# Patient Record
Sex: MALE | Race: WHITE | NOT HISPANIC OR LATINO | URBAN - METROPOLITAN AREA
[De-identification: names, ages, dates, MRNs, and addresses within clinical notes are randomized per-mention and may not be internally consistent; named-entity substitution may affect disease eponyms.]

---

## 2014-01-30 LAB — CRC RECOMMENDATION EXT: NORMAL

## 2022-04-10 ENCOUNTER — HISTORICAL (OUTPATIENT)
Dept: ADMINISTRATIVE | Facility: HOSPITAL | Age: 57
End: 2022-04-10

## 2022-04-26 VITALS
HEIGHT: 70 IN | OXYGEN SATURATION: 98 % | WEIGHT: 232.13 LBS | BODY MASS INDEX: 33.23 KG/M2 | SYSTOLIC BLOOD PRESSURE: 119 MMHG | DIASTOLIC BLOOD PRESSURE: 81 MMHG

## 2022-06-09 RX ORDER — CITALOPRAM 10 MG/1
TABLET ORAL
COMMUNITY
Start: 2022-02-07 | End: 2023-07-17 | Stop reason: SDUPTHER

## 2022-06-09 RX ORDER — ATORVASTATIN CALCIUM 40 MG/1
40 TABLET, FILM COATED ORAL DAILY
COMMUNITY
Start: 2022-03-25

## 2022-06-09 RX ORDER — LISINOPRIL 10 MG/1
TABLET ORAL
COMMUNITY
Start: 2021-12-17 | End: 2022-06-20 | Stop reason: SDUPTHER

## 2022-06-20 DIAGNOSIS — I10 HYPERTENSION, UNSPECIFIED TYPE: Primary | ICD-10-CM

## 2022-06-21 RX ORDER — LISINOPRIL 10 MG/1
10 TABLET ORAL DAILY
Qty: 90 TABLET | Refills: 3 | Status: SHIPPED | OUTPATIENT
Start: 2022-06-21 | End: 2023-06-14 | Stop reason: SDUPTHER

## 2022-12-13 ENCOUNTER — PATIENT OUTREACH (OUTPATIENT)
Dept: ADMINISTRATIVE | Facility: HOSPITAL | Age: 57
End: 2022-12-13

## 2023-06-14 DIAGNOSIS — I10 HYPERTENSION, UNSPECIFIED TYPE: ICD-10-CM

## 2023-06-14 RX ORDER — LISINOPRIL 10 MG/1
10 TABLET ORAL DAILY
Qty: 90 TABLET | Refills: 3 | Status: SHIPPED | OUTPATIENT
Start: 2023-06-14

## 2023-07-17 DIAGNOSIS — F32.A DEPRESSION, UNSPECIFIED DEPRESSION TYPE: Primary | ICD-10-CM

## 2023-07-17 RX ORDER — CITALOPRAM 10 MG/1
TABLET ORAL
Qty: 90 TABLET | Refills: 0 | Status: SHIPPED | OUTPATIENT
Start: 2023-07-17

## 2024-04-17 ENCOUNTER — OFFICE VISIT (OUTPATIENT)
Dept: URGENT CARE | Facility: CLINIC | Age: 59
End: 2024-04-17
Payer: COMMERCIAL

## 2024-04-17 VITALS
HEIGHT: 71 IN | HEART RATE: 64 BPM | BODY MASS INDEX: 29.68 KG/M2 | OXYGEN SATURATION: 98 % | RESPIRATION RATE: 18 BRPM | SYSTOLIC BLOOD PRESSURE: 122 MMHG | WEIGHT: 212 LBS | TEMPERATURE: 99 F | DIASTOLIC BLOOD PRESSURE: 81 MMHG

## 2024-04-17 DIAGNOSIS — R10.32 LEFT LOWER QUADRANT ABDOMINAL PAIN: Primary | ICD-10-CM

## 2024-04-17 PROCEDURE — 99213 OFFICE O/P EST LOW 20 MIN: CPT | Mod: ,,, | Performed by: FAMILY MEDICINE

## 2024-04-17 RX ORDER — TADALAFIL 10 MG/1
10 TABLET ORAL
COMMUNITY
Start: 2024-03-28

## 2024-04-17 NOTE — PROGRESS NOTES
"Subjective:      Patient ID: Jason Leal is a 58 y.o. male.    Vitals:  height is 5' 11" (1.803 m) and weight is 96.2 kg (212 lb). His temperature is 98.5 °F (36.9 °C). His blood pressure is 122/81 and his pulse is 64. His respiration is 18 and oxygen saturation is 98%.     Chief Complaint: Abdominal Pain     Patient is a 58 y.o. male who presents to urgent care with complaints of LLQ pain since after pulling around an ice chest x 2 weeks. Alleviating factors include Tylenol with mild amount of relief.     Abdominal Pain      Gastrointestinal:  Positive for abdominal pain.        ROS:  Constitutional : Negative for fever, Negative for weakness  HEENT : No sore throat,No earache  Neck : Negative except HPI  Respiratory : Negative for shortness of breath and wheezing  Cardiovascular : Negative for chest pain, no palpitations   Gastrointestinal : Negative except as documented in HPI  Integumentary : Negative for skin rash  Neurological : Negative except HPI     58-year-old male with known history of hypertension, elevated cholesterol present to clinic with concerns of left lower quadrant abdominal pain.  Started 2 weeks ago when he pulled heavy ice chest for long distance.  Symptoms improved, when he was holding his activities for some time including running.  Started running again in the last 3-4 days and felt the pain.  Appears concerned with hernia.  No local swelling, no inguinal swelling.  Objective:     Physical Exam  General : Alert and Oriented, No apparent distress, afebrile  Neck - supple  HENT : Oropharynx no redness or swelling.   Respiratory : Bilateral equal breath sounds, nonlabored respirations  Cardiovascular : Rate, rhythm regular, normal volume pulse, no murmur  Gastrointestinal: Full abdomen, soft, left lower quadrant tenderness to palpate, no guarding or rigidity.  All other quadrants nontender to palpate, no palpable swelling.  No redness or bruising  Left groin nontender to " palpate  Integumentary : Warm, Dry and no rash    Assessment:     1. Left lower quadrant abdominal pain      Plan:   Discussed in detail on physical finding and differential diagnosis   Considering the duration of symptoms and localized pain, ultrasound today to rule out possible hernia  Encouraged to monitor the symptoms.  ER precautions.  Ultrasound results will be monitored on reported.  Call this clinic for any questions    Left lower quadrant abdominal pain  -     US Abdomen Limited; Future; Expected date: 04/17/2024

## 2024-04-17 NOTE — PATIENT INSTRUCTIONS
Discussed in detail on physical finding and differential diagnosis   Considering the duration of symptoms and localized pain, ultrasound today to rule out possible hernia  Encouraged to monitor the symptoms.  ER precautions.  Ultrasound results will be monitored on reported.  Call this clinic for any questions

## 2024-06-05 DIAGNOSIS — Z00.00 WELLNESS EXAMINATION: Primary | ICD-10-CM

## 2024-06-06 PROBLEM — Z00.00 WELLNESS EXAMINATION: Status: ACTIVE | Noted: 2024-06-06

## 2024-06-06 NOTE — PROGRESS NOTES
Date: 06/13/2024  Patient ID: [id number]   Chief Complaint: Annual Exam    HPI:   Jason Leal is a 58 y.o. male here today for an annual wellness. Reviewed and discussed lab results: labs wnl.    Overall he feels well. Anxiety improved since changed jobs. Conditions are stable with meds. 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 1 month ago pt developed lesions on base of penis after being exposed to allergen when he developed URI. URI resolved, but lesions remained: not itchy or painful or draining. He tried tea tree oil, salicylic acid, lysine. Lesions did not improve but did not spread. He is in monogamous relationship but did have extra relations 3+yrs ago    Diet: healthy  Activity level: exercising regularly. Runs 3-4x/wk    Patient Active Problem List   Diagnosis    Wellness examination    HTN (hypertension)    HLD (hyperlipidemia)    Anxiety disorder, unspecified    Class 1 obesity with body mass index (BMI) of 30.0 to 30.9 in adult    Erectile dysfunction    Grouped skin lesions     Outpatient Medications Marked as Taking for the 6/13/24 encounter (Office Visit) with Rosario Arriola MD   Medication Sig Dispense Refill    tadalafiL (CIALIS) 10 MG tablet Take 10 mg by mouth.      [DISCONTINUED] atorvastatin (LIPITOR) 40 MG tablet Take 40 mg by mouth once daily.      [DISCONTINUED] citalopram (CELEXA) 10 MG tablet See Instructions, Take 1 tablet by mouth once daily, # 90 tab(s), 3 Refill(s), Pharmacy: [facility], 178, cm, Height/Length Dosing, 10/05/21 13:47:00 CDT, 105.3, kg, Weight Dosing, 10/05/21 13:47:00 CDT 90 tablet 0    [DISCONTINUED] lisinopriL 10 MG tablet Take 1 tablet (10 mg total) by mouth once daily. 90 tablet 3     Past Medical History:   Diagnosis Date    Anxiety disorder, unspecified     HLD (hyperlipidemia)     HTN (hypertension)      Past Surgical History:   Procedure Laterality Date    APPENDECTOMY       Review of patient's allergies indicates:  No Known  Allergies  Family History   Problem Relation Name Age of Onset    Heart disease Father      Diabetes type II Brother        Social History     Socioeconomic History    Marital status:    Tobacco Use    Smoking status: Never     Passive exposure: Never    Smokeless tobacco: Never   Substance and Sexual Activity    Alcohol use: Yes    Drug use: Never    Sexual activity: Yes     Social Determinants of Health     Financial Resource Strain: Low Risk  (6/13/2024)    Overall Financial Resource Strain (CARDIA)     Difficulty of Paying Living Expenses: Not hard at all   Food Insecurity: No Food Insecurity (6/13/2024)    Hunger Vital Sign     Worried About Running Out of Food in the Last Year: Never true     Ran Out of Food in the Last Year: Never true   Transportation Needs: No Transportation Needs (6/13/2024)    TRANSPORTATION NEEDS     Transportation : No   Physical Activity: Sufficiently Active (6/13/2024)    Exercise Vital Sign     Days of Exercise per Week: 4 days     Minutes of Exercise per Session: 60 min   Stress: No Stress Concern Present (6/13/2024)    [employer] - Occupational Stress Questionnaire     Feeling of Stress : Not at all   Housing Stability: Low Risk  (6/13/2024)    Housing Stability Vital Sign     Unable to Pay for Housing in the Last Year: No     Homeless in the Last Year: No     Patient Care Team:  Rosario Arriola MD as PCP - General (Family Medicine)  Sebas Hernandez MD as Consulting Physician (Gastroenterology)   Subjective:   Review of Systems   Constitutional: Negative.  Negative for fever and weight loss.   HENT:  Negative for congestion, hearing loss and sore throat.    Eyes:  Negative for blurred vision.   Respiratory:  Negative for cough and shortness of breath.    Cardiovascular:  Negative for chest pain, palpitations and leg swelling.   Gastrointestinal:  Negative for abdominal pain, blood in stool, constipation, diarrhea, nausea and vomiting.  "  Genitourinary:  Negative for dysuria, frequency, hematuria and urgency.        Weak stream   Musculoskeletal:  Negative for joint pain.   Skin:  Negative for rash.   Neurological:  Negative for weakness and headaches.   Psychiatric/Behavioral:  Negative for depression. The patient is not nervous/anxious and does not have insomnia.      See HPI for details  All Other ROS: Negative except as stated in HPI  Objective:   /82   Pulse 60   Temp 97.5 °F (36.4 °C)   Ht 5' 11" (1.803 m)   Wt 97.8 kg (215 lb 9.6 oz)   SpO2 98%   BMI 30.07 kg/m²   Physical Exam  Constitutional:       Appearance: Normal appearance. He is obese.   HENT:      Head: Normocephalic and atraumatic.      Right Ear: External ear normal.      Left Ear: External ear normal.      Nose: No congestion or rhinorrhea.      Mouth/Throat:      Pharynx: No oropharyngeal exudate or posterior oropharyngeal erythema.   Eyes:      General: No scleral icterus.        Right eye: No discharge.         Left eye: No discharge.      Extraocular Movements: Extraocular movements intact.      Conjunctiva/sclera: Conjunctivae normal.   Cardiovascular:      Rate and Rhythm: Normal rate and regular rhythm.      Pulses: Normal pulses.      Heart sounds: Normal heart sounds.   Pulmonary:      Effort: Pulmonary effort is normal.      Breath sounds: Normal breath sounds.   Abdominal:      General: Abdomen is flat. Bowel sounds are normal.      Palpations: Abdomen is soft.   Genitourinary:     Penis: Normal.       Testes: Normal.      Comments: 1-2mm papular raised grouped lesions around base of penis without bleeding, discharge, erythema  Chaperone present  Musculoskeletal:         General: No swelling or deformity. Normal range of motion.      Cervical back: Normal range of motion and neck supple.   Skin:     General: Skin is warm and dry.   Neurological:      Mental Status: He is alert and oriented to person, place, and time.   Psychiatric:         Mood and Affect: " Mood normal.         Behavior: Behavior normal.         Thought Content: Thought content normal.         Judgment: Judgment normal.       Assessment:       ICD-10-CM ICD-9-CM   1. Wellness examination  Z00.00 V70.0   2. Primary hypertension  I10 401.9   3. Hyperlipidemia, unspecified hyperlipidemia type  E78.5 272.4   4. Anxiety disorder, unspecified type  F41.9 300.00   5. Class 1 obesity with body mass index (BMI) of 30.0 to 30.9 in adult, unspecified obesity type, unspecified whether serious comorbidity present  E66.9 278.00    Z68.30 V85.30   6. Erectile dysfunction, unspecified erectile dysfunction type  N52.9 607.84   7. Screening for colorectal cancer  Z12.11 V76.51    Z12.12 V76.41   8. Grouped skin lesions  L98.9 709.9   9. Screening for malignant neoplasm of prostate  Z12.5 V76.44      Plan:   1. Wellness examination  Assessment & Plan:  Provided Jason KWAN Elvis with a 5-10 year written screening schedule and personal prevention plan. Recommendations were developed using the USPSTF age appropriate recommendations. Education, counseling, and referrals were provided as needed. After Visit Summary printed and given to patient which includes a list of additional screenings\tests needed.         2. Primary hypertension  Assessment & Plan:  Stable  Continue current medication regimen: Lisinopril 10mg qd  Blood pressure at goal <140/90 (<130/80 if otherwise noted)  Recommend DASH diet  Avoid tobacco use  Record BP at home daily and bring log to next office visit, assure that home cuff is calibrated at minimum every 12 months      Orders:  -     lisinopriL 10 MG tablet; Take 1 tablet (10 mg total) by mouth once daily.  Dispense: 90 tablet; Refill: 3  -     Comprehensive Metabolic Panel; Future; Expected date: 06/13/2025    3. Hyperlipidemia, unspecified hyperlipidemia type  Assessment & Plan:  Stable  Continue Rx: Atorvastatin 40mg qd   The ASCVD Risk score (Anni TYLER, et al., 2019) failed to calculate for the  following reasons:    The valid total cholesterol range is 130 to 320 mg/dL   Can try omega-3 fatty acids (total,LDL,TG), niacin (TG), CoQ10 (total), Red yeast rice (LDL,TG,inc HDL)  Encourage weight loss by least 5% and to increase aerobic exercise and resistance training  Limit simple sugars and simple carbohydrate intake-focus on low glycemic foods  Optimize blood sugar control          Orders:  -     atorvastatin (LIPITOR) 40 MG tablet; Take 1 tablet (40 mg total) by mouth once daily.  Dispense: 90 tablet; Refill: 3  -     Lipid Panel; Future; Expected date: 06/13/2025    4. Anxiety disorder, unspecified type  Assessment & Plan:  Stable  Continue Celexa 10mg qd  Refill alprazolam prn for flying    Orders:  -     ALPRAZolam (XANAX) 0.25 MG tablet; Take 1 tablet (0.25 mg total) by mouth 3 (three) times daily as needed for Anxiety.  Dispense: 12 tablet; Refill: 0  -     citalopram (CELEXA) 10 MG tablet; See Instructions, Take 1 tablet by mouth once daily, # 90 tab(s), 3 Refill(s), Pharmacy: [facility], 178, cm, Height/Length Dosing, 10/05/21 13:47:00 CDT, 105.3, kg, Weight Dosing, 10/05/21 13:47:00 CDT  Dispense: 90 tablet; Refill: 3    5. Class 1 obesity with body mass index (BMI) of 30.0 to 30.9 in adult, unspecified obesity type, unspecified whether serious comorbidity present  Assessment & Plan:  Body mass index is 30.07 kg/m².  For BMI 25-30: recommend behavior modifications  Recommend intensive, multicomponent, behavioral interventions: Goal BMI <30.  -Goal is to exercise at least 5 times a week for 30 minutes per day.   -Stand more each day.   -Increase exercise: Start with 10 minutes daily and build up to 60-90 minutes/day with moderate activity  -Reduce caloric intake to about 1500 kcal/day  -Avoid soda, simple sugars, excessive rice, potatoes or bread. Limit fast foods and fried foods.  -Choose complex carbs in moderation (example: green vegetables, beans, oatmeal). Eat plenty of fresh fruits  and vegetables with lean meats daily.  -Do not skip meals. Eat a balanced portion size.  -Avoid fad diets. Consider long-term healthy life style changes.       Orders:  -     Hemoglobin A1C; Future; Expected date: 06/13/2025    6. Erectile dysfunction, unspecified erectile dysfunction type  Assessment & Plan:  Stable  Continue cialis 10mg prn      7. Screening for colorectal cancer  -     Ambulatory referral/consult to Endo Procedure     8. Grouped skin lesions  Assessment & Plan:  Refer to dermatology     Orders:  -     Ambulatory referral/consult to Dermatology; Future; Expected date: 06/20/2024    9. Screening for malignant neoplasm of prostate  -     PSA, Screening; Future; Expected date: 06/13/2025         Medication List with Changes/Refills   New Medications    ALPRAZOLAM (XANAX) 0.25 MG TABLET    Take 1 tablet (0.25 mg total) by mouth 3 (three) times daily as needed for Anxiety.       Start Date: 6/13/2024 End Date: --   Current Medications    TADALAFIL (CIALIS) 10 MG TABLET    Take 10 mg by mouth.       Start Date: 3/28/2024 End Date: --   Changed and/or Refilled Medications    Modified Medication Previous Medication    ATORVASTATIN (LIPITOR) 40 MG TABLET atorvastatin (LIPITOR) 40 MG tablet       Take 1 tablet (40 mg total) by mouth once daily.    Take 40 mg by mouth once daily.       Start Date: 6/13/2024 End Date: --    Start Date: 3/25/2022 End Date: 6/13/2024    CITALOPRAM (CELEXA) 10 MG TABLET citalopram (CELEXA) 10 MG tablet       See Instructions, Take 1 tablet by mouth once daily, # 90 tab(s), 3 Refill(s), Pharmacy: [facility], 178, cm, Height/Length Dosing, 10/05/21 13:47:00 CDT, 105.3, kg, Weight Dosing, 10/05/21 13:47:00 CDT    See Instructions, Take 1 tablet by mouth once daily, # 90 tab(s), 3 Refill(s), Pharmacy: [facility], 178, cm, Height/Length Dosing, 10/05/21 13:47:00 CDT, 105.3, kg, Weight Dosing, 10/05/21 13:47:00 CDT       Start Date: 6/13/2024 End Date: --     Start Date: 7/17/2023 End Date: 6/13/2024    LISINOPRIL 10 MG TABLET lisinopriL 10 MG tablet       Take 1 tablet (10 mg total) by mouth once daily.    Take 1 tablet (10 mg total) by mouth once daily.       Start Date: 6/13/2024 End Date: --    Start Date: 6/14/2023 End Date: 6/13/2024   Discontinued Medications    ALPRAZOLAM ODT (NIRAVAM) 0.5 MG TBDL    by Per OG tube route nightly as needed.       Start Date: --        End Date: 6/13/2024        The patient's Health Maintenance was reviewed and the following appears to be due at this time:   Health Maintenance Due   Topic Date Due    Hepatitis C Screening  Never done    HIV Screening  Never done    TETANUS VACCINE  Never done    Hemoglobin A1c (Diabetic Prevention Screening)  Never done    Shingles Vaccine (1 of 2) Never done    Colorectal Cancer Screening  01/30/2019    COVID-19 Vaccine (1 - 2023-24 season) Never done    RSV Vaccine (Age 60+ and Pregnant patients) (1 - 1-dose 60+ series) Never done     Health Maintenance Topics with due status: Not Due       Topic Last Completion Date    Lipid Panel 06/11/2024    Influenza Vaccine Not Due        Alcohol/Tobacco Use - Discussed importance of smoking avoidance/cessation and limiting alcohol intake.    CVD Risk Factors - Reviewed and discussed with patient    Obesity/Physical Activity - BMI = Body mass index is 30.07 kg/m².  . Encouraged 30 minute daily physical activity, 5 days per week.    STD screening (At least once 15-65y or when necessary) - deferred     Depression screening (Every year or when necessary)- negative    Colon Cancer Screening -  1/2014 AMY Hernandez polyps, diverticulosis. Rpt in 5 yrs    Prostate Cancer Screening (>55y or >40y with FH)- Follow up annually as indicated.  Lab Results   Component Value Date    PSA 1.50 06/11/2024         Eye Exam - Recommend annual eye exams    Dental Exam - Recommend biannual exams    Vaccinations - will obtain at pharmacy    A separate E/M code has been provided  to evaluate additional concerns addressed during the dedicated Wellness Exam.    Follow up if symptoms worsen or fail to improve, for Wellness. In addition to their scheduled follow up, the patient has also been instructed to follow up on as needed basis.

## 2024-06-06 NOTE — ASSESSMENT & PLAN NOTE
Provided Jason Leal with a 5-10 year written screening schedule and personal prevention plan. Recommendations were developed using the USPSTF age appropriate recommendations. Education, counseling, and referrals were provided as needed. After Visit Summary printed and given to patient which includes a list of additional screenings\tests needed.

## 2024-06-11 ENCOUNTER — LAB VISIT (OUTPATIENT)
Dept: LAB | Facility: HOSPITAL | Age: 59
End: 2024-06-11
Attending: STUDENT IN AN ORGANIZED HEALTH CARE EDUCATION/TRAINING PROGRAM
Payer: COMMERCIAL

## 2024-06-11 DIAGNOSIS — Z00.00 WELLNESS EXAMINATION: ICD-10-CM

## 2024-06-11 LAB
ALBUMIN SERPL-MCNC: 4.3 G/DL (ref 3.4–4.8)
ALBUMIN/GLOB SERPL: 1.7 RATIO (ref 1.1–2)
ALP SERPL-CCNC: 75 UNIT/L (ref 40–150)
ALT SERPL-CCNC: 26 UNIT/L (ref 0–55)
ANION GAP SERPL CALC-SCNC: 5 MEQ/L
AST SERPL-CCNC: 23 UNIT/L (ref 5–34)
BASOPHILS # BLD AUTO: 0.03 X10(3)/MCL
BASOPHILS NFR BLD AUTO: 0.5 %
BILIRUB SERPL-MCNC: 4 MG/DL
BUN SERPL-MCNC: 19.7 MG/DL (ref 8.4–25.7)
CALCIUM SERPL-MCNC: 9.5 MG/DL (ref 8.8–10)
CHLORIDE SERPL-SCNC: 106 MMOL/L (ref 98–107)
CHOLEST SERPL-MCNC: 124 MG/DL
CHOLEST/HDLC SERPL: 3 {RATIO} (ref 0–5)
CO2 SERPL-SCNC: 28 MMOL/L (ref 23–31)
CREAT SERPL-MCNC: 0.89 MG/DL (ref 0.73–1.18)
CREAT/UREA NIT SERPL: 22
EOSINOPHIL # BLD AUTO: 0.28 X10(3)/MCL (ref 0–0.9)
EOSINOPHIL NFR BLD AUTO: 5.1 %
ERYTHROCYTE [DISTWIDTH] IN BLOOD BY AUTOMATED COUNT: 12.7 % (ref 11.5–17)
GFR SERPLBLD CREATININE-BSD FMLA CKD-EPI: >60 ML/MIN/1.73/M2
GLOBULIN SER-MCNC: 2.5 GM/DL (ref 2.4–3.5)
GLUCOSE SERPL-MCNC: 97 MG/DL (ref 82–115)
HCT VFR BLD AUTO: 47.2 % (ref 42–52)
HDLC SERPL-MCNC: 41 MG/DL (ref 35–60)
HGB BLD-MCNC: 16.1 G/DL (ref 14–18)
IMM GRANULOCYTES # BLD AUTO: 0.02 X10(3)/MCL (ref 0–0.04)
IMM GRANULOCYTES NFR BLD AUTO: 0.4 %
LDLC SERPL CALC-MCNC: 70 MG/DL (ref 50–140)
LYMPHOCYTES # BLD AUTO: 1.31 X10(3)/MCL (ref 0.6–4.6)
LYMPHOCYTES NFR BLD AUTO: 23.7 %
MCH RBC QN AUTO: 30.7 PG (ref 27–31)
MCHC RBC AUTO-ENTMCNC: 34.1 G/DL (ref 33–36)
MCV RBC AUTO: 89.9 FL (ref 80–94)
MONOCYTES # BLD AUTO: 0.55 X10(3)/MCL (ref 0.1–1.3)
MONOCYTES NFR BLD AUTO: 10 %
NEUTROPHILS # BLD AUTO: 3.33 X10(3)/MCL (ref 2.1–9.2)
NEUTROPHILS NFR BLD AUTO: 60.3 %
NRBC BLD AUTO-RTO: 0 %
PLATELET # BLD AUTO: 186 X10(3)/MCL (ref 130–400)
PMV BLD AUTO: 10.2 FL (ref 7.4–10.4)
POTASSIUM SERPL-SCNC: 4.5 MMOL/L (ref 3.5–5.1)
PROT SERPL-MCNC: 6.8 GM/DL (ref 5.8–7.6)
PSA SERPL-MCNC: 1.5 NG/ML
RBC # BLD AUTO: 5.25 X10(6)/MCL (ref 4.7–6.1)
SODIUM SERPL-SCNC: 139 MMOL/L (ref 136–145)
TRIGL SERPL-MCNC: 65 MG/DL (ref 34–140)
VLDLC SERPL CALC-MCNC: 13 MG/DL
WBC # SPEC AUTO: 5.52 X10(3)/MCL (ref 4.5–11.5)

## 2024-06-11 PROCEDURE — 85025 COMPLETE CBC W/AUTO DIFF WBC: CPT

## 2024-06-11 PROCEDURE — 36415 COLL VENOUS BLD VENIPUNCTURE: CPT

## 2024-06-11 PROCEDURE — 80061 LIPID PANEL: CPT

## 2024-06-11 PROCEDURE — 80053 COMPREHEN METABOLIC PANEL: CPT

## 2024-06-11 PROCEDURE — 84153 ASSAY OF PSA TOTAL: CPT

## 2024-06-13 ENCOUNTER — OFFICE VISIT (OUTPATIENT)
Dept: PRIMARY CARE CLINIC | Facility: CLINIC | Age: 59
End: 2024-06-13
Payer: COMMERCIAL

## 2024-06-13 VITALS
HEIGHT: 71 IN | TEMPERATURE: 98 F | OXYGEN SATURATION: 98 % | WEIGHT: 215.63 LBS | SYSTOLIC BLOOD PRESSURE: 128 MMHG | HEART RATE: 60 BPM | DIASTOLIC BLOOD PRESSURE: 82 MMHG | BODY MASS INDEX: 30.19 KG/M2

## 2024-06-13 DIAGNOSIS — Z12.11 SCREENING FOR COLORECTAL CANCER: ICD-10-CM

## 2024-06-13 DIAGNOSIS — L98.9 GROUPED SKIN LESIONS: ICD-10-CM

## 2024-06-13 DIAGNOSIS — Z12.12 SCREENING FOR COLORECTAL CANCER: ICD-10-CM

## 2024-06-13 DIAGNOSIS — E66.9 CLASS 1 OBESITY WITH BODY MASS INDEX (BMI) OF 30.0 TO 30.9 IN ADULT, UNSPECIFIED OBESITY TYPE, UNSPECIFIED WHETHER SERIOUS COMORBIDITY PRESENT: ICD-10-CM

## 2024-06-13 DIAGNOSIS — E78.5 HYPERLIPIDEMIA, UNSPECIFIED HYPERLIPIDEMIA TYPE: ICD-10-CM

## 2024-06-13 DIAGNOSIS — N52.9 ERECTILE DYSFUNCTION, UNSPECIFIED ERECTILE DYSFUNCTION TYPE: ICD-10-CM

## 2024-06-13 DIAGNOSIS — F41.9 ANXIETY DISORDER, UNSPECIFIED TYPE: ICD-10-CM

## 2024-06-13 DIAGNOSIS — Z12.5 SCREENING FOR MALIGNANT NEOPLASM OF PROSTATE: ICD-10-CM

## 2024-06-13 DIAGNOSIS — Z00.00 WELLNESS EXAMINATION: Primary | ICD-10-CM

## 2024-06-13 DIAGNOSIS — I10 PRIMARY HYPERTENSION: ICD-10-CM

## 2024-06-13 PROBLEM — E66.811 CLASS 1 OBESITY WITH BODY MASS INDEX (BMI) OF 30.0 TO 30.9 IN ADULT: Status: ACTIVE | Noted: 2024-06-13

## 2024-06-13 PROCEDURE — 4010F ACE/ARB THERAPY RXD/TAKEN: CPT | Mod: CPTII,,, | Performed by: STUDENT IN AN ORGANIZED HEALTH CARE EDUCATION/TRAINING PROGRAM

## 2024-06-13 PROCEDURE — 3079F DIAST BP 80-89 MM HG: CPT | Mod: CPTII,,, | Performed by: STUDENT IN AN ORGANIZED HEALTH CARE EDUCATION/TRAINING PROGRAM

## 2024-06-13 PROCEDURE — 1160F RVW MEDS BY RX/DR IN RCRD: CPT | Mod: CPTII,,, | Performed by: STUDENT IN AN ORGANIZED HEALTH CARE EDUCATION/TRAINING PROGRAM

## 2024-06-13 PROCEDURE — 99396 PREV VISIT EST AGE 40-64: CPT | Mod: ,,, | Performed by: STUDENT IN AN ORGANIZED HEALTH CARE EDUCATION/TRAINING PROGRAM

## 2024-06-13 PROCEDURE — 3008F BODY MASS INDEX DOCD: CPT | Mod: CPTII,,, | Performed by: STUDENT IN AN ORGANIZED HEALTH CARE EDUCATION/TRAINING PROGRAM

## 2024-06-13 PROCEDURE — 1159F MED LIST DOCD IN RCRD: CPT | Mod: CPTII,,, | Performed by: STUDENT IN AN ORGANIZED HEALTH CARE EDUCATION/TRAINING PROGRAM

## 2024-06-13 PROCEDURE — 99214 OFFICE O/P EST MOD 30 MIN: CPT | Mod: 25,,, | Performed by: STUDENT IN AN ORGANIZED HEALTH CARE EDUCATION/TRAINING PROGRAM

## 2024-06-13 PROCEDURE — 3074F SYST BP LT 130 MM HG: CPT | Mod: CPTII,,, | Performed by: STUDENT IN AN ORGANIZED HEALTH CARE EDUCATION/TRAINING PROGRAM

## 2024-06-13 RX ORDER — ALPRAZOLAM 0.25 MG/1
0.25 TABLET ORAL 3 TIMES DAILY PRN
Qty: 12 TABLET | Refills: 0 | Status: SHIPPED | OUTPATIENT
Start: 2024-06-13

## 2024-06-13 RX ORDER — CITALOPRAM 10 MG/1
TABLET ORAL
Qty: 90 TABLET | Refills: 3 | Status: SHIPPED | OUTPATIENT
Start: 2024-06-13

## 2024-06-13 RX ORDER — ATORVASTATIN CALCIUM 40 MG/1
40 TABLET, FILM COATED ORAL DAILY
Qty: 90 TABLET | Refills: 3 | Status: SHIPPED | OUTPATIENT
Start: 2024-06-13

## 2024-06-13 RX ORDER — LISINOPRIL 10 MG/1
10 TABLET ORAL DAILY
Qty: 90 TABLET | Refills: 3 | Status: SHIPPED | OUTPATIENT
Start: 2024-06-13

## 2024-06-13 RX ORDER — ALPRAZOLAM 0.5 MG/1
TABLET, ORALLY DISINTEGRATING ORAL NIGHTLY PRN
COMMUNITY
End: 2024-06-13

## 2024-06-13 NOTE — ASSESSMENT & PLAN NOTE
Stable  Continue Rx: Atorvastatin 40mg qd   The ASCVD Risk score (Anni TYLER, et al., 2019) failed to calculate for the following reasons:    The valid total cholesterol range is 130 to 320 mg/dL   Can try omega-3 fatty acids (total,LDL,TG), niacin (TG), CoQ10 (total), Red yeast rice (LDL,TG,inc HDL)  Encourage weight loss by least 5% and to increase aerobic exercise and resistance training  Limit simple sugars and simple carbohydrate intake-focus on low glycemic foods  Optimize blood sugar control

## 2024-06-13 NOTE — ASSESSMENT & PLAN NOTE
Body mass index is 30.07 kg/m².  For BMI 25-30: recommend behavior modifications  Recommend intensive, multicomponent, behavioral interventions: Goal BMI <30.  -Goal is to exercise at least 5 times a week for 30 minutes per day.   -Stand more each day.   -Increase exercise: Start with 10 minutes daily and build up to 60-90 minutes/day with moderate activity  -Reduce caloric intake to about 1500 kcal/day  -Avoid soda, simple sugars, excessive rice, potatoes or bread. Limit fast foods and fried foods.  -Choose complex carbs in moderation (example: green vegetables, beans, oatmeal). Eat plenty of fresh fruits and vegetables with lean meats daily.  -Do not skip meals. Eat a balanced portion size.  -Avoid fad diets. Consider long-term healthy life style changes.

## 2024-06-17 ENCOUNTER — OFFICE VISIT (OUTPATIENT)
Dept: URGENT CARE | Facility: CLINIC | Age: 59
End: 2024-06-17
Payer: COMMERCIAL

## 2024-06-17 VITALS
HEART RATE: 54 BPM | TEMPERATURE: 97 F | WEIGHT: 215 LBS | SYSTOLIC BLOOD PRESSURE: 127 MMHG | RESPIRATION RATE: 18 BRPM | DIASTOLIC BLOOD PRESSURE: 80 MMHG | OXYGEN SATURATION: 100 % | HEIGHT: 71 IN | BODY MASS INDEX: 30.1 KG/M2

## 2024-06-17 DIAGNOSIS — L03.90 CELLULITIS OF SKIN: Primary | ICD-10-CM

## 2024-06-17 DIAGNOSIS — Z23 NEED FOR TDAP VACCINATION: ICD-10-CM

## 2024-06-17 PROCEDURE — 90471 IMMUNIZATION ADMIN: CPT | Mod: ,,, | Performed by: FAMILY MEDICINE

## 2024-06-17 PROCEDURE — 99213 OFFICE O/P EST LOW 20 MIN: CPT | Mod: 25,,, | Performed by: FAMILY MEDICINE

## 2024-06-17 PROCEDURE — 90715 TDAP VACCINE 7 YRS/> IM: CPT | Mod: ,,, | Performed by: FAMILY MEDICINE

## 2024-06-17 RX ORDER — MUPIROCIN 20 MG/G
OINTMENT TOPICAL 3 TIMES DAILY
Qty: 15 G | Refills: 0 | Status: SHIPPED | OUTPATIENT
Start: 2024-06-17

## 2024-06-17 RX ORDER — CEPHALEXIN 500 MG/1
500 CAPSULE ORAL EVERY 8 HOURS
Qty: 15 CAPSULE | Refills: 0 | Status: SHIPPED | OUTPATIENT
Start: 2024-06-17 | End: 2024-06-22

## 2024-06-17 NOTE — PROGRESS NOTES
"Subjective:      Patient ID: Jason Leal is a 58 y.o. male.    Vitals:  height is 5' 11" (1.803 m) and weight is 97.5 kg (215 lb). His temperature is 97.3 °F (36.3 °C). His blood pressure is 127/80 and his pulse is 54 (abnormal). His respiration is 18 and oxygen saturation is 100%.     Chief Complaint: Recurrent Skin Infections     Patient is a 58 y.o. male who presents to urgent care with complaints of possible skin infection after hitting it with a jose martin hose  x 2 weeks.     ROS :  Constitutional : No Fever, No  Chills  Neck : Negative except HPI  Respiratory : Negative for shortness of breath and wheezing  Cardiovascular : Negative for chest pain, No palpitations  Gastrointestinal : No nausea, No vomiting, No abdominal pain or diarrhea  Muskeloskeletal : Negative except HPI  Integumentary : As Per HPI  Neurological : No tingling, No numbness, No weakness     58-year-old male with known history of hypertension, elevated cholesterol, anxiety currently on medications.  Follows up with primary MD.  Present to clinic with concerns of possible skin infection.  States was hit by a jose martin hose accidentally while working in the [employer] 2 weeks ago.  No fever.  Appears concerned with some redness and pain locally.  Can not recall last tetanus immunization  Objective:     Physical Exam  General : Alert and oriented, No apparent distress, Afebrile, appears comfortable sitting in the exam chair, clear speech and appropriate communication  Neck -: supple, Non Tender  Respiratory :Lungs are clear to auscultate, Breath sounds are equal  Cardiovascular : Normal rate, normal volume pulse bilateral  Musculoskeletal :  Right leg mid shin to distinct erythematous area, mild induration, tender to palpate.  No active drainage.  No pain with like passive movements, no local crepitus.  Integumentary : Warm, Dry  Neurologic : Alert and Oriented X 4, sensations intact, motor intact   Assessment:     1. Cellulitis of skin    2. Need for Tdap " vaccination      Plan:   Discussed the physical finding, condition and course.  Monitor the symptoms.  Considering ongoing symptoms for 2 weeks we will start on antibiotics  Start Keflex today as directed.  Keep the area dry and clean.  Topical antibiotic cream for 3 days.  Can alternate Tylenol ibuprofen for pain and discomfort    Up-date on Tetanus vaccination today.  Voiced understanding.  Call or return to clinic for any questions    Cellulitis of skin  -     cephALEXin (KEFLEX) 500 MG capsule; Take 1 capsule (500 mg total) by mouth every 8 (eight) hours. for 5 days  Dispense: 15 capsule; Refill: 0  -     mupirocin (BACTROBAN) 2 % ointment; Apply topically 3 (three) times daily.  Dispense: 15 g; Refill: 0    Need for Tdap vaccination  -     Tdap Vaccine

## 2024-06-17 NOTE — PATIENT INSTRUCTIONS
Discussed the physical finding, condition and course.  Monitor the symptoms.  Considering ongoing symptoms for 2 weeks we will start on antibiotics  Start Keflex today as directed.  Keep the area dry and clean.  Topical antibiotic cream for 3 days.  Can alternate Tylenol ibuprofen for pain and discomfort    Up-date on Tetanus vaccination today.  Voiced understanding.  Call or return to clinic for any questions

## 2024-09-09 PROBLEM — Z00.00 WELLNESS EXAMINATION: Status: RESOLVED | Noted: 2024-06-06 | Resolved: 2024-09-09

## 2025-06-13 DIAGNOSIS — I10 PRIMARY HYPERTENSION: ICD-10-CM

## 2025-06-13 RX ORDER — LISINOPRIL 10 MG/1
10 TABLET ORAL DAILY
Qty: 90 TABLET | Refills: 3 | Status: SHIPPED | OUTPATIENT
Start: 2025-06-13